# Patient Record
Sex: MALE | Race: WHITE | NOT HISPANIC OR LATINO | URBAN - METROPOLITAN AREA
[De-identification: names, ages, dates, MRNs, and addresses within clinical notes are randomized per-mention and may not be internally consistent; named-entity substitution may affect disease eponyms.]

---

## 2023-03-07 ENCOUNTER — TELEPHONE (OUTPATIENT)
Dept: PRIMARY CARE | Facility: CLINIC | Age: 83
End: 2023-03-07
Payer: MEDICARE

## 2023-03-10 NOTE — TELEPHONE ENCOUNTER
I talked to the patient's son Bhaskar and will order home health and he will find some assisted living for patient to go to

## 2023-04-22 DIAGNOSIS — K21.9 GASTROESOPHAGEAL REFLUX DISEASE, UNSPECIFIED WHETHER ESOPHAGITIS PRESENT: ICD-10-CM

## 2023-04-22 DIAGNOSIS — Z00.00 ROUTINE GENERAL MEDICAL EXAMINATION AT A HEALTH CARE FACILITY: ICD-10-CM

## 2023-04-22 DIAGNOSIS — I15.9 SECONDARY HYPERTENSION: ICD-10-CM

## 2023-04-25 PROBLEM — I10 HYPERTENSION: Status: ACTIVE | Noted: 2023-04-25

## 2023-04-25 RX ORDER — MECLIZINE HYDROCHLORIDE 25 MG/1
TABLET ORAL
Qty: 42 TABLET | Refills: 0 | Status: SHIPPED | OUTPATIENT
Start: 2023-04-25 | End: 2023-06-21

## 2023-04-25 RX ORDER — METOPROLOL TARTRATE 25 MG/1
TABLET, FILM COATED ORAL
Qty: 15 TABLET | Refills: 0 | Status: SHIPPED | OUTPATIENT
Start: 2023-04-25 | End: 2023-06-21 | Stop reason: SDUPTHER

## 2023-04-25 RX ORDER — PANTOPRAZOLE SODIUM 40 MG/1
TABLET, DELAYED RELEASE ORAL
Qty: 60 TABLET | Refills: 0 | Status: SHIPPED | OUTPATIENT
Start: 2023-04-25 | End: 2023-06-21 | Stop reason: SDUPTHER

## 2023-06-16 DIAGNOSIS — Z00.00 ROUTINE GENERAL MEDICAL EXAMINATION AT A HEALTH CARE FACILITY: ICD-10-CM

## 2023-06-16 DIAGNOSIS — I15.9 SECONDARY HYPERTENSION: ICD-10-CM

## 2023-06-16 DIAGNOSIS — K21.9 GASTROESOPHAGEAL REFLUX DISEASE, UNSPECIFIED WHETHER ESOPHAGITIS PRESENT: ICD-10-CM

## 2023-06-21 ENCOUNTER — OFFICE VISIT (OUTPATIENT)
Dept: PRIMARY CARE | Facility: CLINIC | Age: 83
End: 2023-06-21
Payer: MEDICARE

## 2023-06-21 VITALS
BODY MASS INDEX: 19.23 KG/M2 | HEART RATE: 61 BPM | DIASTOLIC BLOOD PRESSURE: 84 MMHG | SYSTOLIC BLOOD PRESSURE: 135 MMHG | OXYGEN SATURATION: 96 % | WEIGHT: 142 LBS | HEIGHT: 72 IN

## 2023-06-21 DIAGNOSIS — M25.552 PAIN OF LEFT HIP: Primary | ICD-10-CM

## 2023-06-21 DIAGNOSIS — F32.A DEPRESSION, UNSPECIFIED DEPRESSION TYPE: ICD-10-CM

## 2023-06-21 DIAGNOSIS — K21.9 GASTROESOPHAGEAL REFLUX DISEASE, UNSPECIFIED WHETHER ESOPHAGITIS PRESENT: ICD-10-CM

## 2023-06-21 DIAGNOSIS — I15.9 SECONDARY HYPERTENSION: ICD-10-CM

## 2023-06-21 DIAGNOSIS — E78.5 HYPERLIPIDEMIA, UNSPECIFIED HYPERLIPIDEMIA TYPE: ICD-10-CM

## 2023-06-21 PROCEDURE — 1170F FXNL STATUS ASSESSED: CPT | Performed by: EMERGENCY MEDICINE

## 2023-06-21 PROCEDURE — 1036F TOBACCO NON-USER: CPT | Performed by: EMERGENCY MEDICINE

## 2023-06-21 PROCEDURE — 99397 PER PM REEVAL EST PAT 65+ YR: CPT | Performed by: EMERGENCY MEDICINE

## 2023-06-21 PROCEDURE — G0442 ANNUAL ALCOHOL SCREEN 15 MIN: HCPCS | Performed by: EMERGENCY MEDICINE

## 2023-06-21 PROCEDURE — G0446 INTENS BEHAVE THER CARDIO DX: HCPCS | Performed by: EMERGENCY MEDICINE

## 2023-06-21 PROCEDURE — G0439 PPPS, SUBSEQ VISIT: HCPCS | Performed by: EMERGENCY MEDICINE

## 2023-06-21 PROCEDURE — 3075F SYST BP GE 130 - 139MM HG: CPT | Performed by: EMERGENCY MEDICINE

## 2023-06-21 PROCEDURE — 99213 OFFICE O/P EST LOW 20 MIN: CPT | Performed by: EMERGENCY MEDICINE

## 2023-06-21 PROCEDURE — 3079F DIAST BP 80-89 MM HG: CPT | Performed by: EMERGENCY MEDICINE

## 2023-06-21 PROCEDURE — 1159F MED LIST DOCD IN RCRD: CPT | Performed by: EMERGENCY MEDICINE

## 2023-06-21 PROCEDURE — 99497 ADVNCD CARE PLAN 30 MIN: CPT | Performed by: EMERGENCY MEDICINE

## 2023-06-21 RX ORDER — ESCITALOPRAM OXALATE 10 MG/1
10 TABLET ORAL DAILY
Qty: 30 TABLET | Refills: 2 | Status: SHIPPED | OUTPATIENT
Start: 2023-06-21 | End: 2023-12-18

## 2023-06-21 RX ORDER — TRAMADOL HYDROCHLORIDE 50 MG/1
TABLET ORAL EVERY 6 HOURS PRN
COMMUNITY

## 2023-06-21 RX ORDER — ROPINIROLE 0.5 MG/1
1 TABLET, FILM COATED ORAL NIGHTLY
COMMUNITY
Start: 2022-05-21

## 2023-06-21 RX ORDER — ROSUVASTATIN CALCIUM 20 MG/1
20 TABLET, COATED ORAL DAILY
Qty: 90 TABLET | Refills: 1 | Status: SHIPPED | OUTPATIENT
Start: 2023-06-21

## 2023-06-21 RX ORDER — SERTRALINE HYDROCHLORIDE 50 MG/1
1 TABLET, FILM COATED ORAL DAILY
COMMUNITY
Start: 2021-03-26

## 2023-06-21 RX ORDER — MULTIVITAMIN
TABLET ORAL
COMMUNITY

## 2023-06-21 RX ORDER — PANTOPRAZOLE SODIUM 40 MG/1
40 TABLET, DELAYED RELEASE ORAL 2 TIMES DAILY
Qty: 180 TABLET | Refills: 1 | Status: SHIPPED | OUTPATIENT
Start: 2023-06-21

## 2023-06-21 RX ORDER — GABAPENTIN 100 MG/1
100 CAPSULE ORAL 3 TIMES DAILY
Qty: 90 CAPSULE | Refills: 1 | Status: SHIPPED | OUTPATIENT
Start: 2023-06-21 | End: 2023-06-29 | Stop reason: SDUPTHER

## 2023-06-21 RX ORDER — ROSUVASTATIN CALCIUM 20 MG/1
20 TABLET, COATED ORAL DAILY
COMMUNITY
End: 2023-06-21 | Stop reason: SDUPTHER

## 2023-06-21 RX ORDER — PANTOPRAZOLE SODIUM 40 MG/1
TABLET, DELAYED RELEASE ORAL
Qty: 60 TABLET | Refills: 0 | Status: SHIPPED | OUTPATIENT
Start: 2023-06-21

## 2023-06-21 RX ORDER — MECLIZINE HYDROCHLORIDE 25 MG/1
TABLET ORAL
Qty: 42 TABLET | Refills: 0 | Status: SHIPPED | OUTPATIENT
Start: 2023-06-21

## 2023-06-21 RX ORDER — METOPROLOL TARTRATE 25 MG/1
TABLET, FILM COATED ORAL
Qty: 15 TABLET | Refills: 0 | Status: SHIPPED | OUTPATIENT
Start: 2023-06-21

## 2023-06-21 RX ORDER — TAMSULOSIN HYDROCHLORIDE 0.4 MG/1
CAPSULE ORAL
COMMUNITY
Start: 2022-09-01

## 2023-06-21 RX ORDER — METOPROLOL TARTRATE 25 MG/1
12.5 TABLET, FILM COATED ORAL DAILY
Qty: 90 TABLET | Refills: 1 | Status: SHIPPED | OUTPATIENT
Start: 2023-06-21

## 2023-06-21 ASSESSMENT — PATIENT HEALTH QUESTIONNAIRE - PHQ9
SUM OF ALL RESPONSES TO PHQ9 QUESTIONS 1 AND 2: 1
10. IF YOU CHECKED OFF ANY PROBLEMS, HOW DIFFICULT HAVE THESE PROBLEMS MADE IT FOR YOU TO DO YOUR WORK, TAKE CARE OF THINGS AT HOME, OR GET ALONG WITH OTHER PEOPLE: SOMEWHAT DIFFICULT
1. LITTLE INTEREST OR PLEASURE IN DOING THINGS: NOT AT ALL
2. FEELING DOWN, DEPRESSED OR HOPELESS: SEVERAL DAYS

## 2023-06-21 ASSESSMENT — ACTIVITIES OF DAILY LIVING (ADL)
DRESSING: INDEPENDENT
MANAGING_FINANCES: NEEDS ASSISTANCE
TAKING_MEDICATION: NEEDS ASSISTANCE
DOING_HOUSEWORK: TOTAL CARE
BATHING: INDEPENDENT
GROCERY_SHOPPING: NEEDS ASSISTANCE

## 2023-06-21 ASSESSMENT — ENCOUNTER SYMPTOMS
LOSS OF SENSATION IN FEET: 0
OCCASIONAL FEELINGS OF UNSTEADINESS: 1
DEPRESSION: 1

## 2023-06-21 NOTE — PROGRESS NOTES
Subjective   Reason for Visit: Renzo Cummins is an 83 y.o. male here for a Medicare Wellness visit.     Past Medical, Surgical, and Family History reviewed and updated in chart.    Reviewed all medications by prescribing practitioner or clinical pharmacist (such as prescriptions, OTCs, herbal therapies and supplements) and documented in the medical record.    HPI  83 year old for Medicare wellness and follow up visit    Seen in ED 6/18 for severe arthritis of left hip and knee. Was given tramadol for pain control, follow up scheduled with ortho next week.   Patient had inguinal hernia repair 1/25/23.     Concerned about weight loss. Patient's son reports he eats 3 meals a day regularly. Does admit to feeling depressed.   Denies suicidal or homicidal ideation.     Significant past health history includes chronic pain due to neck discomfort and thoracic back pain., Significant degenerative disc and joint disease of all spinal segments, hypertension, hyperlipidemia, history of intraocular hemorrhage, chronic intermittent vertigo, chronic opioid use, thoracic aortic aneurysm evaluated by CT, mild to moderate thrombocytopenia followed by hematology.     Patient lives independently in his home. He has some care deficits in which his son helps, however he could benefit from additional help within the home.     Patient Care Team:  Franky King MD as PCP - General     Review of Systems  Comprehensive review of symptoms was not positive for any symptoms other than HPI.      Objective   Vitals:  /84   Pulse 61   Ht 1.829 m (6')   Wt 64.4 kg (142 lb)   SpO2 96%   BMI 19.26 kg/m²       Physical Exam  General appearance: Alert and in no acute distress  HEENT: Normal Inspection.  Neck: Normal Inspectiopn  Respiratory: No respiratory distress.   Cardiovascular: heart rate normal. No gallop  Back: normal inspection  Skin inspection: Warm  Musculoskeletal: No deformities  Neuro: Grossly Intact  Psychiatric: Cooperative      Assessment/Plan   Problem List Items Addressed This Visit          Circulatory    Hypertension    Relevant Medications    metoprolol tartrate (Lopressor) 25 mg tablet     Other Visit Diagnoses       Pain of left hip    -  Primary    Relevant Medications    gabapentin (Neurontin) 100 mg capsule    Hyperlipidemia, unspecified hyperlipidemia type        Relevant Medications    rosuvastatin (Crestor) 20 mg tablet    Gastroesophageal reflux disease, unspecified whether esophagitis present        Relevant Medications    pantoprazole (ProtoNix) 40 mg EC tablet          Left knee pain- steroid injection done today, see below.     Left hip arthritis- currently taking tylenol and tramadol as needed for pain with little benefit. Gabapentin ordered.   Ortho follow up next week.     Depression- will start on lexapro.   If weight loss persistent CT to be considered.     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 BP stable.      Hyperlipidemia- Crestor     History of vertigo- meclizine prn      GERD -PPI     Preventive care-at age of 82, patient is not a candidate for any preventive care    Procedures  Under aseptic procedures 2cc of steroid injected in left knee, no complications.     PH Q-9 depression screening was completed by authorized employee of the practice and answer of the questionnaire were explained and discussed with the patient.    Face-to-face with discussion completed with this individual regarding their cardiovascular risk and behavioral therapies of nutritional choices, exercise and elimination of habits contradicting to the risk. We agreed on how they may be able to reduce their current cardiovascular risk.     Screening for alcohol use completed.      I discussed advanced care planning including the explanation and discussion of advanced directives. If patient does not have current up  to date documents, examples and information provided on how to create both living will and power of . Patient was encouraged to work on completing these documents.  Information and advise was also provided on DO NOT RESUSCITATE and patient encouraged to consider this  Patient signed DNR CCA today.       Follow up in 3 months or sooner as needed      Scribe Attestation  By signing my name below, I, Melissa Sam , Scribe   attest that this documentation has been prepared under the direction and in the presence of Franky King MD.

## 2023-06-28 ENCOUNTER — TELEPHONE (OUTPATIENT)
Dept: PRIMARY CARE | Facility: CLINIC | Age: 83
End: 2023-06-28
Payer: MEDICARE

## 2023-06-28 DIAGNOSIS — M25.552 PAIN OF LEFT HIP: ICD-10-CM

## 2023-06-30 RX ORDER — GABAPENTIN 100 MG/1
100 CAPSULE ORAL 3 TIMES DAILY
Qty: 60 CAPSULE | Refills: 0 | Status: SHIPPED | OUTPATIENT
Start: 2023-06-30 | End: 2023-08-17 | Stop reason: SDUPTHER

## 2023-08-16 ENCOUNTER — TELEPHONE (OUTPATIENT)
Dept: PRIMARY CARE | Facility: CLINIC | Age: 83
End: 2023-08-16
Payer: MEDICARE

## 2023-08-16 DIAGNOSIS — M25.552 PAIN OF LEFT HIP: ICD-10-CM

## 2023-08-17 RX ORDER — GABAPENTIN 100 MG/1
100 CAPSULE ORAL 3 TIMES DAILY
Qty: 90 CAPSULE | Refills: 0 | Status: SHIPPED | OUTPATIENT
Start: 2023-08-17 | End: 2023-09-20

## 2023-08-21 ENCOUNTER — TELEPHONE (OUTPATIENT)
Dept: PRIMARY CARE | Facility: CLINIC | Age: 83
End: 2023-08-21
Payer: MEDICARE

## 2023-09-18 DIAGNOSIS — M25.552 PAIN OF LEFT HIP: ICD-10-CM

## 2023-09-20 RX ORDER — GABAPENTIN 100 MG/1
100 CAPSULE ORAL 3 TIMES DAILY
Qty: 270 CAPSULE | Refills: 1 | Status: SHIPPED | OUTPATIENT
Start: 2023-09-20 | End: 2023-09-27 | Stop reason: SDUPTHER

## 2023-09-25 ENCOUNTER — TELEPHONE (OUTPATIENT)
Dept: PRIMARY CARE | Facility: CLINIC | Age: 83
End: 2023-09-25

## 2023-09-25 ENCOUNTER — TELEPHONE (OUTPATIENT)
Dept: PRIMARY CARE | Facility: CLINIC | Age: 83
End: 2023-09-25
Payer: MEDICARE

## 2023-09-25 DIAGNOSIS — M25.552 PAIN OF LEFT HIP: ICD-10-CM

## 2023-09-27 ENCOUNTER — OFFICE VISIT (OUTPATIENT)
Dept: PRIMARY CARE | Facility: CLINIC | Age: 83
End: 2023-09-27
Payer: MEDICARE

## 2023-09-27 VITALS
WEIGHT: 143.4 LBS | DIASTOLIC BLOOD PRESSURE: 74 MMHG | SYSTOLIC BLOOD PRESSURE: 110 MMHG | HEART RATE: 63 BPM | HEIGHT: 72 IN | OXYGEN SATURATION: 98 % | BODY MASS INDEX: 19.42 KG/M2

## 2023-09-27 DIAGNOSIS — E78.5 HYPERLIPIDEMIA, UNSPECIFIED HYPERLIPIDEMIA TYPE: ICD-10-CM

## 2023-09-27 DIAGNOSIS — Z01.818 ENCOUNTER FOR PREADMISSION TESTING: ICD-10-CM

## 2023-09-27 DIAGNOSIS — I10 PRIMARY HYPERTENSION: Primary | ICD-10-CM

## 2023-09-27 DIAGNOSIS — R42 VERTIGO: ICD-10-CM

## 2023-09-27 DIAGNOSIS — Z00.00 ROUTINE GENERAL MEDICAL EXAMINATION AT A HEALTH CARE FACILITY: ICD-10-CM

## 2023-09-27 DIAGNOSIS — F32.A DEPRESSION, UNSPECIFIED DEPRESSION TYPE: ICD-10-CM

## 2023-09-27 DIAGNOSIS — M19.90 ARTHRITIS: ICD-10-CM

## 2023-09-27 DIAGNOSIS — D64.9 ANEMIA, UNSPECIFIED TYPE: ICD-10-CM

## 2023-09-27 DIAGNOSIS — K21.9 CHRONIC GERD: ICD-10-CM

## 2023-09-27 DIAGNOSIS — M25.552 PAIN OF LEFT HIP: ICD-10-CM

## 2023-09-27 DIAGNOSIS — M25.552 LEFT HIP PAIN: ICD-10-CM

## 2023-09-27 PROBLEM — I71.40 ABDOMINAL AORTIC ANEURYSM WITHOUT RUPTURE (CMS-HCC): Status: ACTIVE | Noted: 2022-11-07

## 2023-09-27 PROCEDURE — 3074F SYST BP LT 130 MM HG: CPT | Performed by: EMERGENCY MEDICINE

## 2023-09-27 PROCEDURE — 1159F MED LIST DOCD IN RCRD: CPT | Performed by: EMERGENCY MEDICINE

## 2023-09-27 PROCEDURE — 3078F DIAST BP <80 MM HG: CPT | Performed by: EMERGENCY MEDICINE

## 2023-09-27 PROCEDURE — 1036F TOBACCO NON-USER: CPT | Performed by: EMERGENCY MEDICINE

## 2023-09-27 PROCEDURE — 99214 OFFICE O/P EST MOD 30 MIN: CPT | Performed by: EMERGENCY MEDICINE

## 2023-09-27 RX ORDER — GABAPENTIN 100 MG/1
100 CAPSULE ORAL 3 TIMES DAILY
Qty: 270 CAPSULE | Refills: 1 | Status: SHIPPED | OUTPATIENT
Start: 2023-09-27

## 2023-09-27 NOTE — PROGRESS NOTES
Subjective   Patient ID: Renzo Cummins is a 83 y.o. male who presents for PAT.    Assessment/Plan   Problem List Items Addressed This Visit       Hypertension - Primary    Arthritis    Depression    Vertigo    Chronic GERD    Left hip pain    Hyperlipidemia     Other Visit Diagnoses       Anemia, unspecified type        Relevant Orders    CBC and Auto Differential    Encounter for preadmission testing        Relevant Orders    Basic Metabolic Panel    Urinalysis Microscopic Only    Type and screen    Routine general medical examination at a health care facility        Relevant Orders    Protime-INR    aPTT    Pain of left hip              Left hip arthritis- scheduled for left total hip replacment with Dr. Driver 10/11/2023   Planning to complete home therapy.   Patient is cleared for surgery with moderate risk as specified  below.     Toenail fungus- referred to podiatry     Left knee pain- some improvement with steroid injection in past     Depression- mood improved with lexapro 10mg. Weight stabilized.     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 BP stable.      Hyperlipidemia- continue Crestor     History of vertigo- meclizine prn      GERD- PPI    Pre-op labs ordered     Preventive care-at age of 82, patient is not a candidate for any preventive care    Follow up post-op or sooner as needed     Source of history: Nurse, Medical personnel, Medical record, Patient.  History limitation: None.    HPI  83 year old male here for preadmission testing    Patient scheduled for left total hip replacement on 10/11/2023 with Dr. Driver    Inguinal hernia repair 1/25/23.     Significant past health history includes chronic pain due to neck discomfort and thoracic back pain., Significant degenerative disc and joint disease of all spinal segments, hypertension, hyperlipidemia, history of  intraocular hemorrhage, chronic intermittent vertigo, chronic opioid use, thoracic aortic aneurysm evaluated by CT, mild to moderate thrombocytopenia followed by hematology.     Patient lives independently in his home. He has some care deficits in which his son helps, however he could benefit from additional help within the home.       No Known Allergies    Current Outpatient Medications   Medication Sig Dispense Refill    escitalopram (Lexapro) 10 mg tablet Take 1 tablet (10 mg) by mouth once daily. 30 tablet 2    gabapentin (Neurontin) 100 mg capsule TAKE ONE CAPSULE BY MOUTH THREE TIMES A  capsule 1    meclizine (Antivert) 25 mg tablet TAKE ONE TABLET BY MOUTH THREE TIMES A DAY AS NEEDED 42 tablet 0    metoprolol tartrate (Lopressor) 25 mg tablet TAKE ONE-HALF TABLET BY MOUTH DAILY 15 tablet 0    metoprolol tartrate (Lopressor) 25 mg tablet Take 0.5 tablets (12.5 mg) by mouth once daily. 90 tablet 1    multivitamin tablet Take by mouth.      pantoprazole (ProtoNix) 40 mg EC tablet TAKE ONE TABLET BY MOUTH TWO TIMES A DAY 60 tablet 0    pantoprazole (ProtoNix) 40 mg EC tablet Take 1 tablet (40 mg) by mouth 2 times a day. Do not crush, chew, or split. 180 tablet 1    rOPINIRole (Requip) 0.5 mg tablet Take 1 tablet (0.5 mg) by mouth once daily at bedtime.      rosuvastatin (Crestor) 20 mg tablet Take 1 tablet (20 mg) by mouth once daily. 90 tablet 1    sertraline (Zoloft) 50 mg tablet Take 1 tablet (50 mg) by mouth once daily.      tamsulosin (Flomax) 0.4 mg 24 hr capsule Take by mouth.      traMADol (Ultram) 50 mg tablet Take by mouth every 6 hours if needed for severe pain (7 - 10).       No current facility-administered medications for this visit.       Objective   Visit Vitals  /74   Pulse 63   Ht 1.829 m (6')   Wt 65 kg (143 lb 6.4 oz)   SpO2 98%   BMI 19.45 kg/m²   Smoking Status Never   BSA 1.82 m²     Physical Exam  Vital signs as per nursing/MA documentation   General appearance: Alert and in  no acute distress  HEENT: Normal Inspection   Neck: Normal Inspection   Respiratory: No respiratory distress Lungs are clear   Cardiovascular: Heart rate normal. No gallop  Back: Normal Inspection   Skin inspection: Warm   Musculoskeletal: No deformities   Neuro: Limited exam. Baseline    Review of Systems   Comprehensive review of systems as allowed by patient condition and nursing input is negative    No visits with results within 4 Month(s) from this visit.   Latest known visit with results is:   Legacy Encounter on 12/03/2021   Component Date Value Ref Range Status    Glucose 12/03/2021 95  74 - 99 mg/dL Final    Sodium 12/03/2021 141  136 - 145 mmol/L Final    Potassium 12/03/2021 3.9  3.5 - 5.3 mmol/L Final    Chloride 12/03/2021 103  98 - 107 mmol/L Final    Bicarbonate 12/03/2021 31  21 - 32 mmol/L Final    Anion Gap 12/03/2021 11  10 - 20 mmol/L Final    Urea Nitrogen 12/03/2021 16  6 - 23 mg/dL Final    Creatinine 12/03/2021 1.47 (H)  0.50 - 1.30 mg/dL Final    GLOMERULAR FILTRATION RATE-NON AFR* 12/03/2021 46 (A)  >60 mL/min/1.73m2 Final    GLOMERULAR FILTRATION RATE-* 12/03/2021 56 (A)  >60 mL/min/1.73m2 Final    Calcium 12/03/2021 9.1  8.6 - 10.6 mg/dL Final    Albumin 12/03/2021 4.2  3.4 - 5.0 g/dL Final    Alkaline Phosphatase 12/03/2021 66  33 - 136 U/L Final    Total Protein 12/03/2021 6.5  6.4 - 8.2 g/dL Final    AST 12/03/2021 19  9 - 39 U/L Final    Total Bilirubin 12/03/2021 0.8  0.0 - 1.2 mg/dL Final    ALT (SGPT) 12/03/2021 10  10 - 52 U/L Final    WBC 12/03/2021 4.4  4.4 - 11.3 x10E9/L Final    nRBC 12/03/2021 0.0  0.0 - 0.0 /100 WBC Final    RBC 12/03/2021 4.20 (L)  4.50 - 5.90 x10E12/L Final    Hemoglobin 12/03/2021 13.9  13.5 - 17.5 g/dL Final    Hematocrit 12/03/2021 42.1  41.0 - 52.0 % Final    MCV 12/03/2021 100  80 - 100 fL Final    MCHC 12/03/2021 33.0  32.0 - 36.0 g/dL Final    Platelets 12/03/2021 111 (L)  150 - 450 x10E9/L Final    RDW 12/03/2021 12.9  11.5 - 14.5 % Final     Neutrophils % 12/03/2021 63.2  40.0 - 80.0 % Final    Immature Granulocytes %, Automated 12/03/2021 0.2  0.0 - 0.9 % Final    Lymphocytes % 12/03/2021 22.6  13.0 - 44.0 % Final    Monocytes % 12/03/2021 11.5  2.0 - 10.0 % Final    Eosinophils % 12/03/2021 2.3  0.0 - 6.0 % Final    Basophils % 12/03/2021 0.2  0.0 - 2.0 % Final    Neutrophils Absolute 12/03/2021 2.79  1.60 - 5.50 x10E9/L Final    Lymphocytes Absolute 12/03/2021 1.00  0.80 - 3.00 x10E9/L Final    Monocytes Absolute 12/03/2021 0.51  0.05 - 0.80 x10E9/L Final    Eosinophils Absolute 12/03/2021 0.10  0.00 - 0.40 x10E9/L Final    Basophils Absolute 12/03/2021 0.01  0.00 - 0.10 x10E9/L Final    Hemoglobin A1C 12/03/2021 5.4  % Final    Estimated Average Glucose 12/03/2021 108  MG/DL Final    TSH 12/03/2021 2.78  0.44 - 3.98 mIU/L Final    Cholesterol 12/03/2021 135  0 - 199 mg/dL Final    HDL 12/03/2021 43.9  mg/dL Final    Cholesterol/HDL Ratio 12/03/2021 3.1   Final    LDL 12/03/2021 73  0 - 99 mg/dL Final    VLDL 12/03/2021 18  0 - 40 mg/dL Final    Triglycerides 12/03/2021 92  0 - 149 mg/dL Final       Radiology: Reviewed imaging in powerchart.  No results found.    No family history on file.  Social History     Socioeconomic History    Marital status:      Spouse name: None    Number of children: None    Years of education: None    Highest education level: None   Occupational History    None   Tobacco Use    Smoking status: Never    Smokeless tobacco: Never   Substance and Sexual Activity    Alcohol use: Never    Drug use: None    Sexual activity: None   Other Topics Concern    None   Social History Narrative    None     Social Determinants of Health     Financial Resource Strain: Not on file   Food Insecurity: Not on file   Transportation Needs: Not on file   Physical Activity: Not on file   Stress: Not on file   Social Connections: Not on file   Intimate Partner Violence: Not on file   Housing Stability: Not on file     Past Medical History:    Diagnosis Date    Benign paroxysmal vertigo, unspecified ear     Benign positional vertigo    Chronic sinusitis, unspecified     Recurrent sinusitis    Low back pain, unspecified 03/14/2014    Chronic low back pain    Personal history of diseases of the blood and blood-forming organs and certain disorders involving the immune mechanism     History of thrombocytopenia    Personal history of diseases of the blood and blood-forming organs and certain disorders involving the immune mechanism     History of anemia    Personal history of other diseases of the circulatory system     History of peripheral vascular disease    Personal history of other diseases of the digestive system     History of gastrointestinal hemorrhage    Personal history of other diseases of the digestive system 09/11/2014    History of gastroesophageal reflux (GERD)    Personal history of other diseases of the musculoskeletal system and connective tissue     History of degenerative disc disease    Personal history of other infectious and parasitic diseases     History of rubella    Personal history of other infectious and parasitic diseases     History of varicella    Personal history of other infectious and parasitic diseases     History of mumps    Personal history of other specified conditions     History of headache    Personal history of urinary calculi     Personal history of renal calculi    Spondylosis without myelopathy or radiculopathy, lumbar region     DJD (degenerative joint disease), lumbar     History reviewed. No pertinent surgical history.    Scribe Attestation  By signing my name below, Melissa AMBROSE Scribe   attest that this documentation has been prepared under the direction and in the presence of Franky King MD.

## 2023-09-29 ENCOUNTER — APPOINTMENT (OUTPATIENT)
Dept: PRIMARY CARE | Facility: CLINIC | Age: 83
End: 2023-09-29
Payer: MEDICARE

## 2023-10-02 ENCOUNTER — TELEPHONE (OUTPATIENT)
Dept: PRIMARY CARE | Facility: CLINIC | Age: 83
End: 2023-10-02
Payer: MEDICARE

## 2023-10-02 NOTE — TELEPHONE ENCOUNTER
Patient called and stated that the Gabapentin is making him very dizzy. Wondering what is recommended or what else he can take.

## 2023-10-05 ENCOUNTER — TELEPHONE (OUTPATIENT)
Dept: PRIMARY CARE | Facility: CLINIC | Age: 83
End: 2023-10-05
Payer: MEDICARE

## 2023-10-05 NOTE — TELEPHONE ENCOUNTER
CALL FROM LISA SHARA: [phone]    SON WOULD LIKE TO HAVE A SCRIPT WRITTEN FOR A STAIR CHAIR LIFT BECAUSE PT LIVES IN A SPLIT LEVEL HOME.

## 2023-10-17 ENCOUNTER — NURSING HOME VISIT (OUTPATIENT)
Dept: POST ACUTE CARE | Facility: EXTERNAL LOCATION | Age: 83
End: 2023-10-17
Payer: MEDICARE

## 2023-10-17 DIAGNOSIS — I43 CARDIOMYOPATHY, HYPERTENSIVE, BENIGN, WITHOUT HEART FAILURE (MULTI): ICD-10-CM

## 2023-10-17 DIAGNOSIS — F32.4 MAJOR DEPRESSIVE DISORDER IN PARTIAL REMISSION, UNSPECIFIED WHETHER RECURRENT (CMS-HCC): ICD-10-CM

## 2023-10-17 DIAGNOSIS — I11.9 CARDIOMYOPATHY, HYPERTENSIVE, BENIGN, WITHOUT HEART FAILURE (MULTI): ICD-10-CM

## 2023-10-17 DIAGNOSIS — I25.118 CORONARY ARTERY DISEASE INVOLVING NATIVE HEART WITH OTHER FORM OF ANGINA PECTORIS, UNSPECIFIED VESSEL OR LESION TYPE (CMS-HCC): ICD-10-CM

## 2023-10-17 DIAGNOSIS — I71.40 ABDOMINAL AORTIC ANEURYSM (AAA) WITHOUT RUPTURE, UNSPECIFIED PART (CMS-HCC): ICD-10-CM

## 2023-10-17 DIAGNOSIS — Z96.642 S/P HIP REPLACEMENT, LEFT: Primary | ICD-10-CM

## 2023-10-17 PROBLEM — I25.10 CORONARY ARTERY DISEASE INVOLVING NATIVE HEART: Status: ACTIVE | Noted: 2023-10-17

## 2023-10-17 PROCEDURE — 99497 ADVNCD CARE PLAN 30 MIN: CPT | Performed by: EMERGENCY MEDICINE

## 2023-10-17 PROCEDURE — 99306 1ST NF CARE HIGH MDM 50: CPT | Performed by: EMERGENCY MEDICINE

## 2023-10-17 NOTE — PROGRESS NOTES
Renzo Cummins   83 y.o.  male  @location@            Assessment and Plan:  History and physical    S/p hip replacement-pain control and orthopedic follow-up    Hypertension - Primary      Arthritis     Depression     Vertigo     Chronic GERD     Left hip pain     Hyperlipidemia      Other Visit Diagnoses         Anemia, unspecified type         Relevant Orders     CBC and Auto Differential     Encounter for preadmission testing         Relevant Orders     Basic Metabolic Panel     Urinalysis Microscopic Only     Type and screen     Routine general medical examination at a health care facility         Relevant Orders     Protime-INR     aPTT     Pain of left hip                      Toenail fungus- referred to podiatry      Left knee pain- some improvement with steroid injection in past      Depression- mood improved with lexapro 10mg. Weight stabilized.      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 BP stable.      Hyperlipidemia- continue Crestor     History of vertigo- meclizine prn      GERD- PPI    I discussed advanced care planning including the explanation and discussion of advanced directives. If patient does not have current up to date documents, examples and information provided on how to create both living will and power of . Patient was encouraged to work on completing these documents.  Information and advise was also provided on DO NOT RESUSCITATE and patient encouraged to consider this  Patient is not sure about DNR at this time.  CODE STATUS is on file with the facility       Source of history: Nurse, Medical personnel, Medical record, Patient.  History limitation: None.    HPI:  History and physical    Patient is unable to give any detailed history and therefore history is obtained from the chart  No acute complaints voiced by the patient or acute concerns raised by  nursing    History of hospitalization-  Patient was admitted for and underwent left total hip replacement arthroplasty    Inguinal hernia repair 1/25/23.      Significant past health history includes chronic pain due to neck discomfort and thoracic back pain., Significant degenerative disc and joint disease of all spinal segments, hypertension, hyperlipidemia, history of intraocular hemorrhage, chronic intermittent vertigo, chronic opioid use, thoracic aortic aneurysm evaluated by CT, mild to moderate thrombocytopenia followed by hematology.      Patient lives independently in his home. He has some care deficits in which his son helps, however he could benefit from additional help within the home.      Current Outpatient Medications   Medication Sig Dispense Refill    escitalopram (Lexapro) 10 mg tablet Take 1 tablet (10 mg) by mouth once daily. 30 tablet 2    gabapentin (Neurontin) 100 mg capsule Take 1 capsule (100 mg) by mouth 3 times a day. 270 capsule 1    meclizine (Antivert) 25 mg tablet TAKE ONE TABLET BY MOUTH THREE TIMES A DAY AS NEEDED 42 tablet 0    metoprolol tartrate (Lopressor) 25 mg tablet TAKE ONE-HALF TABLET BY MOUTH DAILY 15 tablet 0    metoprolol tartrate (Lopressor) 25 mg tablet Take 0.5 tablets (12.5 mg) by mouth once daily. 90 tablet 1    multivitamin tablet Take by mouth.      pantoprazole (ProtoNix) 40 mg EC tablet TAKE ONE TABLET BY MOUTH TWO TIMES A DAY 60 tablet 0    pantoprazole (ProtoNix) 40 mg EC tablet Take 1 tablet (40 mg) by mouth 2 times a day. Do not crush, chew, or split. 180 tablet 1    rOPINIRole (Requip) 0.5 mg tablet Take 1 tablet (0.5 mg) by mouth once daily at bedtime.      rosuvastatin (Crestor) 20 mg tablet Take 1 tablet (20 mg) by mouth once daily. 90 tablet 1    sertraline (Zoloft) 50 mg tablet Take 1 tablet (50 mg) by mouth once daily.      tamsulosin (Flomax) 0.4 mg 24 hr capsule Take by mouth.      traMADol (Ultram) 50 mg tablet Take by mouth every 6 hours if needed  for severe pain (7 - 10).       No current facility-administered medications for this visit.       Physical Exam:  Vital signs as per nursing/MA documentation were reviewed  General appearance: Alert and in no acute distress  HEENT: Normal Inspection  Neck - Normal Inspection  Respiratory : No respiratory distress. Lungs are clear   Cardiovascular: heart rate normal. No gallop  Back - normal inspection  Skin inspection:Warm  Musculoskeletal : No deformities  Neuro : Limited exam. Baseline    ROS: Review of symptoms is negative except for what is mentioned in HPI    Results/Data  Records including but not limited to electronic medical records, relevant lab work and imaging from patient's health care facility encounter were reviewed and independently verified      Charting was completed using voice recognition technology and may include unintended errors.    Discussed with patient/family, RN, and NP.

## 2023-10-17 NOTE — LETTER
Patient: Renzo Cummins  : 1940    Encounter Date: 10/17/2023    Renzo Cummins   83 y.o.  male  @location@            Assessment and Plan:  History and physical    S/p hip replacement-pain control and orthopedic follow-up    Hypertension - Primary      Arthritis     Depression     Vertigo     Chronic GERD     Left hip pain     Hyperlipidemia      Other Visit Diagnoses         Anemia, unspecified type         Relevant Orders     CBC and Auto Differential     Encounter for preadmission testing         Relevant Orders     Basic Metabolic Panel     Urinalysis Microscopic Only     Type and screen     Routine general medical examination at a health care facility         Relevant Orders     Protime-INR     aPTT     Pain of left hip                      Toenail fungus- referred to podiatry      Left knee pain- some improvement with steroid injection in past      Depression- mood improved with lexapro 10mg. Weight stabilized.      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 BP stable.      Hyperlipidemia- continue Crestor     History of vertigo- meclizine prn      GERD- PPI    I discussed advanced care planning including the explanation and discussion of advanced directives. If patient does not have current up to date documents, examples and information provided on how to create both living will and power of . Patient was encouraged to work on completing these documents.  Information and advise was also provided on DO NOT RESUSCITATE and patient encouraged to consider this  Patient is not sure about DNR at this time.  CODE STATUS is on file with the facility       Source of history: Nurse, Medical personnel, Medical record, Patient.  History limitation: None.    HPI:  History and physical    Patient is unable to give any detailed history and therefore history is obtained from the chart  No  acute complaints voiced by the patient or acute concerns raised by nursing    History of hospitalization-  Patient was admitted for and underwent left total hip replacement arthroplasty    Inguinal hernia repair 1/25/23.      Significant past health history includes chronic pain due to neck discomfort and thoracic back pain., Significant degenerative disc and joint disease of all spinal segments, hypertension, hyperlipidemia, history of intraocular hemorrhage, chronic intermittent vertigo, chronic opioid use, thoracic aortic aneurysm evaluated by CT, mild to moderate thrombocytopenia followed by hematology.      Patient lives independently in his home. He has some care deficits in which his son helps, however he could benefit from additional help within the home.      Current Outpatient Medications   Medication Sig Dispense Refill   • escitalopram (Lexapro) 10 mg tablet Take 1 tablet (10 mg) by mouth once daily. 30 tablet 2   • gabapentin (Neurontin) 100 mg capsule Take 1 capsule (100 mg) by mouth 3 times a day. 270 capsule 1   • meclizine (Antivert) 25 mg tablet TAKE ONE TABLET BY MOUTH THREE TIMES A DAY AS NEEDED 42 tablet 0   • metoprolol tartrate (Lopressor) 25 mg tablet TAKE ONE-HALF TABLET BY MOUTH DAILY 15 tablet 0   • metoprolol tartrate (Lopressor) 25 mg tablet Take 0.5 tablets (12.5 mg) by mouth once daily. 90 tablet 1   • multivitamin tablet Take by mouth.     • pantoprazole (ProtoNix) 40 mg EC tablet TAKE ONE TABLET BY MOUTH TWO TIMES A DAY 60 tablet 0   • pantoprazole (ProtoNix) 40 mg EC tablet Take 1 tablet (40 mg) by mouth 2 times a day. Do not crush, chew, or split. 180 tablet 1   • rOPINIRole (Requip) 0.5 mg tablet Take 1 tablet (0.5 mg) by mouth once daily at bedtime.     • rosuvastatin (Crestor) 20 mg tablet Take 1 tablet (20 mg) by mouth once daily. 90 tablet 1   • sertraline (Zoloft) 50 mg tablet Take 1 tablet (50 mg) by mouth once daily.     • tamsulosin (Flomax) 0.4 mg 24 hr capsule Take by  mouth.     • traMADol (Ultram) 50 mg tablet Take by mouth every 6 hours if needed for severe pain (7 - 10).       No current facility-administered medications for this visit.       Physical Exam:  Vital signs as per nursing/MA documentation were reviewed  General appearance: Alert and in no acute distress  HEENT: Normal Inspection  Neck - Normal Inspection  Respiratory : No respiratory distress. Lungs are clear   Cardiovascular: heart rate normal. No gallop  Back - normal inspection  Skin inspection:Warm  Musculoskeletal : No deformities  Neuro : Limited exam. Baseline    ROS: Review of symptoms is negative except for what is mentioned in HPI    Results/Data  Records including but not limited to electronic medical records, relevant lab work and imaging from patient's health care facility encounter were reviewed and independently verified      Charting was completed using voice recognition technology and may include unintended errors.    Discussed with patient/family, RN, and NP.      Electronically Signed By: Franky King MD   10/17/23  2:38 PM

## 2023-11-02 ENCOUNTER — NURSING HOME VISIT (OUTPATIENT)
Dept: POST ACUTE CARE | Facility: EXTERNAL LOCATION | Age: 83
End: 2023-11-02
Payer: MEDICARE

## 2023-11-02 DIAGNOSIS — K21.9 CHRONIC GERD: ICD-10-CM

## 2023-11-02 DIAGNOSIS — I71.40 ABDOMINAL AORTIC ANEURYSM (AAA) WITHOUT RUPTURE, UNSPECIFIED PART (CMS-HCC): ICD-10-CM

## 2023-11-02 DIAGNOSIS — I11.9 CARDIOMYOPATHY, HYPERTENSIVE, BENIGN, WITHOUT HEART FAILURE (MULTI): Primary | ICD-10-CM

## 2023-11-02 DIAGNOSIS — Z96.642 S/P HIP REPLACEMENT, LEFT: ICD-10-CM

## 2023-11-02 DIAGNOSIS — I43 CARDIOMYOPATHY, HYPERTENSIVE, BENIGN, WITHOUT HEART FAILURE (MULTI): Primary | ICD-10-CM

## 2023-11-02 PROCEDURE — 99309 SBSQ NF CARE MODERATE MDM 30: CPT | Performed by: EMERGENCY MEDICINE

## 2023-11-02 NOTE — PROGRESS NOTES
Renzo Cummins   83 y.o.  male  @location@      Assessment and Plan:    S/p hip replacement-pain control and orthopedic follow-up    Hypertension - Primary      Arthritis     Depression     Vertigo     Chronic GERD     Left hip pain     Hyperlipidemia      Other Visit Diagnoses         Anemia, unspecified type         Relevant Orders     CBC and Auto Differential     Encounter for preadmission testing         Relevant Orders     Basic Metabolic Panel     Urinalysis Microscopic Only     Type and screen     Routine general medical examination at a health care facility         Relevant Orders     Protime-INR     aPTT     Pain of left hip                      Toenail fungus- referred to podiatry      Left knee pain- some improvement with steroid injection in past      Depression- mood improved with lexapro 10mg. Weight stabilized.      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 BP stable.      Hyperlipidemia- continue Crestor     History of vertigo- meclizine prn      GERD- PPI    -Fall prevention    -Cognitive engagement     -Monitor and treat blood pressure     -Aggressive decubitus ulcer prevention.     -Bowel and bladder care     -Optimal nutrition and supplementation as needed     -GI  and DVT prophylaxis     -Symptom control     -Ambulation as tolerated     -Will follow    Charting is done using voice recognition software and may contain errors which have not been completely corrected         Source of history: Nurse, Medical personnel, Medical record, Patient.  History limitation: None.    HPI:    Patient is unable to give any detailed history and therefore history is obtained from the chart  No acute complaints voiced by the patient or acute concerns raised by nursing    History of hospitalization-  Patient was admitted for and underwent left total hip replacement arthroplasty    Inguinal  hernia repair 1/25/23.      Significant past health history includes chronic pain due to neck discomfort and thoracic back pain., Significant degenerative disc and joint disease of all spinal segments, hypertension, hyperlipidemia, history of intraocular hemorrhage, chronic intermittent vertigo, chronic opioid use, thoracic aortic aneurysm evaluated by CT, mild to moderate thrombocytopenia followed by hematology.      Patient lives independently in his home. He has some care deficits in which his son helps, however he could benefit from additional help within the home.      Current Outpatient Medications   Medication Sig Dispense Refill    escitalopram (Lexapro) 10 mg tablet Take 1 tablet (10 mg) by mouth once daily. 30 tablet 2    gabapentin (Neurontin) 100 mg capsule Take 1 capsule (100 mg) by mouth 3 times a day. 270 capsule 1    meclizine (Antivert) 25 mg tablet TAKE ONE TABLET BY MOUTH THREE TIMES A DAY AS NEEDED 42 tablet 0    metoprolol tartrate (Lopressor) 25 mg tablet TAKE ONE-HALF TABLET BY MOUTH DAILY 15 tablet 0    metoprolol tartrate (Lopressor) 25 mg tablet Take 0.5 tablets (12.5 mg) by mouth once daily. 90 tablet 1    multivitamin tablet Take by mouth.      pantoprazole (ProtoNix) 40 mg EC tablet TAKE ONE TABLET BY MOUTH TWO TIMES A DAY 60 tablet 0    pantoprazole (ProtoNix) 40 mg EC tablet Take 1 tablet (40 mg) by mouth 2 times a day. Do not crush, chew, or split. 180 tablet 1    rOPINIRole (Requip) 0.5 mg tablet Take 1 tablet (0.5 mg) by mouth once daily at bedtime.      rosuvastatin (Crestor) 20 mg tablet Take 1 tablet (20 mg) by mouth once daily. 90 tablet 1    sertraline (Zoloft) 50 mg tablet Take 1 tablet (50 mg) by mouth once daily.      tamsulosin (Flomax) 0.4 mg 24 hr capsule Take by mouth.      traMADol (Ultram) 50 mg tablet Take by mouth every 6 hours if needed for severe pain (7 - 10).       No current facility-administered medications for this visit.       Physical Exam:  Vital signs as  per nursing/MA documentation were reviewed  General appearance: Alert and in no acute distress  HEENT: Normal Inspection  Neck - Normal Inspection  Respiratory : No respiratory distress. Lungs are clear   Cardiovascular: heart rate normal. No gallop  Back - normal inspection  Skin inspection:Warm  Musculoskeletal : No deformities  Neuro : Limited exam. Baseline    ROS: Review of symptoms is negative except for what is mentioned in HPI    Results/Data  Records including but not limited to electronic medical records, relevant lab work and imaging from patient's health care facility encounter were reviewed and independently verified      Charting was completed using voice recognition technology and may include unintended errors.    Discussed with patient/family, RN, and NP.

## 2023-11-02 NOTE — LETTER
Patient: Renzo Cummins  : 1940    Encounter Date: 2023    Renzo Cummins   83 y.o.  male  @location@      Assessment and Plan:    S/p hip replacement-pain control and orthopedic follow-up    Hypertension - Primary      Arthritis     Depression     Vertigo     Chronic GERD     Left hip pain     Hyperlipidemia      Other Visit Diagnoses         Anemia, unspecified type         Relevant Orders     CBC and Auto Differential     Encounter for preadmission testing         Relevant Orders     Basic Metabolic Panel     Urinalysis Microscopic Only     Type and screen     Routine general medical examination at a health care facility         Relevant Orders     Protime-INR     aPTT     Pain of left hip                      Toenail fungus- referred to podiatry      Left knee pain- some improvement with steroid injection in past      Depression- mood improved with lexapro 10mg. Weight stabilized.      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 BP stable.      Hyperlipidemia- continue Crestor     History of vertigo- meclizine prn      GERD- PPI    -Fall prevention    -Cognitive engagement     -Monitor and treat blood pressure     -Aggressive decubitus ulcer prevention.     -Bowel and bladder care     -Optimal nutrition and supplementation as needed     -GI  and DVT prophylaxis     -Symptom control     -Ambulation as tolerated     -Will follow    Charting is done using voice recognition software and may contain errors which have not been completely corrected         Source of history: Nurse, Medical personnel, Medical record, Patient.  History limitation: None.    HPI:    Patient is unable to give any detailed history and therefore history is obtained from the chart  No acute complaints voiced by the patient or acute concerns raised by nursing    History of hospitalization-  Patient was admitted  for and underwent left total hip replacement arthroplasty    Inguinal hernia repair 1/25/23.      Significant past health history includes chronic pain due to neck discomfort and thoracic back pain., Significant degenerative disc and joint disease of all spinal segments, hypertension, hyperlipidemia, history of intraocular hemorrhage, chronic intermittent vertigo, chronic opioid use, thoracic aortic aneurysm evaluated by CT, mild to moderate thrombocytopenia followed by hematology.      Patient lives independently in his home. He has some care deficits in which his son helps, however he could benefit from additional help within the home.      Current Outpatient Medications   Medication Sig Dispense Refill   • escitalopram (Lexapro) 10 mg tablet Take 1 tablet (10 mg) by mouth once daily. 30 tablet 2   • gabapentin (Neurontin) 100 mg capsule Take 1 capsule (100 mg) by mouth 3 times a day. 270 capsule 1   • meclizine (Antivert) 25 mg tablet TAKE ONE TABLET BY MOUTH THREE TIMES A DAY AS NEEDED 42 tablet 0   • metoprolol tartrate (Lopressor) 25 mg tablet TAKE ONE-HALF TABLET BY MOUTH DAILY 15 tablet 0   • metoprolol tartrate (Lopressor) 25 mg tablet Take 0.5 tablets (12.5 mg) by mouth once daily. 90 tablet 1   • multivitamin tablet Take by mouth.     • pantoprazole (ProtoNix) 40 mg EC tablet TAKE ONE TABLET BY MOUTH TWO TIMES A DAY 60 tablet 0   • pantoprazole (ProtoNix) 40 mg EC tablet Take 1 tablet (40 mg) by mouth 2 times a day. Do not crush, chew, or split. 180 tablet 1   • rOPINIRole (Requip) 0.5 mg tablet Take 1 tablet (0.5 mg) by mouth once daily at bedtime.     • rosuvastatin (Crestor) 20 mg tablet Take 1 tablet (20 mg) by mouth once daily. 90 tablet 1   • sertraline (Zoloft) 50 mg tablet Take 1 tablet (50 mg) by mouth once daily.     • tamsulosin (Flomax) 0.4 mg 24 hr capsule Take by mouth.     • traMADol (Ultram) 50 mg tablet Take by mouth every 6 hours if needed for severe pain (7 - 10).       No current  facility-administered medications for this visit.       Physical Exam:  Vital signs as per nursing/MA documentation were reviewed  General appearance: Alert and in no acute distress  HEENT: Normal Inspection  Neck - Normal Inspection  Respiratory : No respiratory distress. Lungs are clear   Cardiovascular: heart rate normal. No gallop  Back - normal inspection  Skin inspection:Warm  Musculoskeletal : No deformities  Neuro : Limited exam. Baseline    ROS: Review of symptoms is negative except for what is mentioned in HPI    Results/Data  Records including but not limited to electronic medical records, relevant lab work and imaging from patient's health care facility encounter were reviewed and independently verified      Charting was completed using voice recognition technology and may include unintended errors.    Discussed with patient/family, RN, and NP.      Electronically Signed By: Franky King MD   11/2/23  5:26 PM

## 2023-11-06 ENCOUNTER — TELEPHONE (OUTPATIENT)
Dept: PRIMARY CARE | Facility: CLINIC | Age: 83
End: 2023-11-06

## 2024-01-20 PROCEDURE — 99232 SBSQ HOSP IP/OBS MODERATE 35: CPT | Performed by: INTERNAL MEDICINE

## 2024-01-27 ENCOUNTER — TELEPHONE (OUTPATIENT)
Dept: PRIMARY CARE | Facility: CLINIC | Age: 84
End: 2024-01-27